# Patient Record
Sex: MALE
[De-identification: names, ages, dates, MRNs, and addresses within clinical notes are randomized per-mention and may not be internally consistent; named-entity substitution may affect disease eponyms.]

---

## 2020-02-09 ENCOUNTER — NURSE TRIAGE (OUTPATIENT)
Dept: OTHER | Facility: CLINIC | Age: 50
End: 2020-02-09

## 2020-02-09 NOTE — TELEPHONE ENCOUNTER
Reason for Disposition   [1] Chemical has been washed off > 30 minutes ago AND [2] still painful    Protocols used: BURNS - CHEMICAL-ADULT-    Spoke to pt for triage. Pt got chemical from \"\" to both hands , right worse than left. The chemical got into his gloves, when he cut his glove off, then he broke skin. Despite, cleaning the hands, the right hand remains \"wrinkled\" looking and painful, especially when he bends at knuckles, rates 3-4/10 - describes as \"burns\". This occurred more than 2 hours ago. Caller reports symptoms as documented above. Caller informed of disposition. Pt verbalizes understanding . Care advice as documented. Please do not respond to the triage nurse through this encounter. Any subsequent communication should be directly with the patient.